# Patient Record
Sex: MALE | Race: WHITE | HISPANIC OR LATINO | URBAN - METROPOLITAN AREA
[De-identification: names, ages, dates, MRNs, and addresses within clinical notes are randomized per-mention and may not be internally consistent; named-entity substitution may affect disease eponyms.]

---

## 2019-12-27 ENCOUNTER — EMERGENCY (EMERGENCY)
Facility: HOSPITAL | Age: 59
LOS: 1 days | Discharge: HOME | End: 2019-12-27
Attending: EMERGENCY MEDICINE
Payer: SELF-PAY

## 2019-12-27 VITALS
SYSTOLIC BLOOD PRESSURE: 168 MMHG | OXYGEN SATURATION: 96 % | TEMPERATURE: 98 F | DIASTOLIC BLOOD PRESSURE: 99 MMHG | RESPIRATION RATE: 20 BRPM | HEART RATE: 73 BPM

## 2019-12-27 VITALS
HEIGHT: 66 IN | DIASTOLIC BLOOD PRESSURE: 104 MMHG | WEIGHT: 220.02 LBS | RESPIRATION RATE: 20 BRPM | SYSTOLIC BLOOD PRESSURE: 184 MMHG | TEMPERATURE: 98 F | HEART RATE: 84 BPM | OXYGEN SATURATION: 96 %

## 2019-12-27 LAB
ALBUMIN SERPL ELPH-MCNC: 4.1 G/DL — SIGNIFICANT CHANGE UP (ref 3.3–5)
ALP SERPL-CCNC: 88 U/L — SIGNIFICANT CHANGE UP (ref 40–120)
ALT FLD-CCNC: 35 U/L — SIGNIFICANT CHANGE UP (ref 10–45)
ANION GAP SERPL CALC-SCNC: 13 MMOL/L — SIGNIFICANT CHANGE UP (ref 5–17)
ANION GAP SERPL CALC-SCNC: 14 MMOL/L — SIGNIFICANT CHANGE UP (ref 5–17)
APPEARANCE UR: CLEAR — SIGNIFICANT CHANGE UP
AST SERPL-CCNC: 55 U/L — HIGH (ref 10–40)
BACTERIA # UR AUTO: NEGATIVE — SIGNIFICANT CHANGE UP
BASE EXCESS BLDV CALC-SCNC: -0.6 MMOL/L — SIGNIFICANT CHANGE UP (ref -2–2)
BASOPHILS # BLD AUTO: 0.1 K/UL — SIGNIFICANT CHANGE UP (ref 0–0.2)
BASOPHILS NFR BLD AUTO: 1 % — SIGNIFICANT CHANGE UP (ref 0–2)
BILIRUB SERPL-MCNC: 0.3 MG/DL — SIGNIFICANT CHANGE UP (ref 0.2–1.2)
BILIRUB UR-MCNC: NEGATIVE — SIGNIFICANT CHANGE UP
BUN SERPL-MCNC: 12 MG/DL — SIGNIFICANT CHANGE UP (ref 7–23)
BUN SERPL-MCNC: 12 MG/DL — SIGNIFICANT CHANGE UP (ref 7–23)
CA-I SERPL-SCNC: 1.09 MMOL/L — LOW (ref 1.12–1.3)
CALCIUM SERPL-MCNC: 8.9 MG/DL — SIGNIFICANT CHANGE UP (ref 8.4–10.5)
CALCIUM SERPL-MCNC: 8.9 MG/DL — SIGNIFICANT CHANGE UP (ref 8.4–10.5)
CHLORIDE BLDV-SCNC: 109 MMOL/L — HIGH (ref 96–108)
CHLORIDE SERPL-SCNC: 104 MMOL/L — SIGNIFICANT CHANGE UP (ref 96–108)
CHLORIDE SERPL-SCNC: 105 MMOL/L — SIGNIFICANT CHANGE UP (ref 96–108)
CO2 BLDV-SCNC: 25 MMOL/L — SIGNIFICANT CHANGE UP (ref 22–30)
CO2 SERPL-SCNC: 20 MMOL/L — LOW (ref 22–31)
CO2 SERPL-SCNC: 24 MMOL/L — SIGNIFICANT CHANGE UP (ref 22–31)
COLOR SPEC: COLORLESS — SIGNIFICANT CHANGE UP
CREAT SERPL-MCNC: 0.87 MG/DL — SIGNIFICANT CHANGE UP (ref 0.5–1.3)
CREAT SERPL-MCNC: 0.97 MG/DL — SIGNIFICANT CHANGE UP (ref 0.5–1.3)
DIFF PNL FLD: NEGATIVE — SIGNIFICANT CHANGE UP
EOSINOPHIL # BLD AUTO: 0.16 K/UL — SIGNIFICANT CHANGE UP (ref 0–0.5)
EOSINOPHIL NFR BLD AUTO: 1.6 % — SIGNIFICANT CHANGE UP (ref 0–6)
EPI CELLS # UR: 0 /HPF — SIGNIFICANT CHANGE UP
GAS PNL BLDV: 134 MMOL/L — LOW (ref 135–145)
GAS PNL BLDV: SIGNIFICANT CHANGE UP
GLUCOSE BLDV-MCNC: 99 MG/DL — SIGNIFICANT CHANGE UP (ref 70–99)
GLUCOSE SERPL-MCNC: 107 MG/DL — HIGH (ref 70–99)
GLUCOSE SERPL-MCNC: 85 MG/DL — SIGNIFICANT CHANGE UP (ref 70–99)
GLUCOSE UR QL: NEGATIVE — SIGNIFICANT CHANGE UP
HCO3 BLDV-SCNC: 24 MMOL/L — SIGNIFICANT CHANGE UP (ref 21–29)
HCT VFR BLD CALC: 47.7 % — SIGNIFICANT CHANGE UP (ref 39–50)
HCT VFR BLDA CALC: 51 % — HIGH (ref 39–50)
HGB BLD CALC-MCNC: 16.6 G/DL — SIGNIFICANT CHANGE UP (ref 13–17)
HGB BLD-MCNC: 16 G/DL — SIGNIFICANT CHANGE UP (ref 13–17)
HYALINE CASTS # UR AUTO: 0 /LPF — SIGNIFICANT CHANGE UP (ref 0–2)
IMM GRANULOCYTES NFR BLD AUTO: 0.5 % — SIGNIFICANT CHANGE UP (ref 0–1.5)
KETONES UR-MCNC: NEGATIVE — SIGNIFICANT CHANGE UP
LACTATE BLDV-MCNC: 1.8 MMOL/L — SIGNIFICANT CHANGE UP (ref 0.7–2)
LEUKOCYTE ESTERASE UR-ACNC: NEGATIVE — SIGNIFICANT CHANGE UP
LYMPHOCYTES # BLD AUTO: 2.39 K/UL — SIGNIFICANT CHANGE UP (ref 1–3.3)
LYMPHOCYTES # BLD AUTO: 24.2 % — SIGNIFICANT CHANGE UP (ref 13–44)
MCHC RBC-ENTMCNC: 30.1 PG — SIGNIFICANT CHANGE UP (ref 27–34)
MCHC RBC-ENTMCNC: 33.5 GM/DL — SIGNIFICANT CHANGE UP (ref 32–36)
MCV RBC AUTO: 89.8 FL — SIGNIFICANT CHANGE UP (ref 80–100)
MONOCYTES # BLD AUTO: 0.66 K/UL — SIGNIFICANT CHANGE UP (ref 0–0.9)
MONOCYTES NFR BLD AUTO: 6.7 % — SIGNIFICANT CHANGE UP (ref 2–14)
NEUTROPHILS # BLD AUTO: 6.52 K/UL — SIGNIFICANT CHANGE UP (ref 1.8–7.4)
NEUTROPHILS NFR BLD AUTO: 66 % — SIGNIFICANT CHANGE UP (ref 43–77)
NITRITE UR-MCNC: NEGATIVE — SIGNIFICANT CHANGE UP
NRBC # BLD: 0 /100 WBCS — SIGNIFICANT CHANGE UP (ref 0–0)
PCO2 BLDV: 42 MMHG — SIGNIFICANT CHANGE UP (ref 35–50)
PH BLDV: 7.38 — SIGNIFICANT CHANGE UP (ref 7.35–7.45)
PH UR: 6.5 — SIGNIFICANT CHANGE UP (ref 5–8)
PLATELET # BLD AUTO: 275 K/UL — SIGNIFICANT CHANGE UP (ref 150–400)
PO2 BLDV: 59 MMHG — HIGH (ref 25–45)
POTASSIUM BLDV-SCNC: 7.6 MMOL/L — CRITICAL HIGH (ref 3.5–5.3)
POTASSIUM SERPL-MCNC: 4.4 MMOL/L — SIGNIFICANT CHANGE UP (ref 3.5–5.3)
POTASSIUM SERPL-MCNC: 5.4 MMOL/L — HIGH (ref 3.5–5.3)
POTASSIUM SERPL-SCNC: 4.4 MMOL/L — SIGNIFICANT CHANGE UP (ref 3.5–5.3)
POTASSIUM SERPL-SCNC: 5.4 MMOL/L — HIGH (ref 3.5–5.3)
PROT SERPL-MCNC: 7.4 G/DL — SIGNIFICANT CHANGE UP (ref 6–8.3)
PROT UR-MCNC: NEGATIVE — SIGNIFICANT CHANGE UP
RBC # BLD: 5.31 M/UL — SIGNIFICANT CHANGE UP (ref 4.2–5.8)
RBC # FLD: 12.8 % — SIGNIFICANT CHANGE UP (ref 10.3–14.5)
RBC CASTS # UR COMP ASSIST: 2 /HPF — SIGNIFICANT CHANGE UP (ref 0–4)
SAO2 % BLDV: 92 % — HIGH (ref 67–88)
SODIUM SERPL-SCNC: 138 MMOL/L — SIGNIFICANT CHANGE UP (ref 135–145)
SODIUM SERPL-SCNC: 142 MMOL/L — SIGNIFICANT CHANGE UP (ref 135–145)
SP GR SPEC: 1.01 — LOW (ref 1.01–1.02)
UROBILINOGEN FLD QL: NEGATIVE — SIGNIFICANT CHANGE UP
WBC # BLD: 9.88 K/UL — SIGNIFICANT CHANGE UP (ref 3.8–10.5)
WBC # FLD AUTO: 9.88 K/UL — SIGNIFICANT CHANGE UP (ref 3.8–10.5)
WBC UR QL: 0 /HPF — SIGNIFICANT CHANGE UP (ref 0–5)

## 2019-12-27 PROCEDURE — 71046 X-RAY EXAM CHEST 2 VIEWS: CPT | Mod: 26

## 2019-12-27 PROCEDURE — 96375 TX/PRO/DX INJ NEW DRUG ADDON: CPT

## 2019-12-27 PROCEDURE — 81001 URINALYSIS AUTO W/SCOPE: CPT

## 2019-12-27 PROCEDURE — 80053 COMPREHEN METABOLIC PANEL: CPT

## 2019-12-27 PROCEDURE — 83605 ASSAY OF LACTIC ACID: CPT

## 2019-12-27 PROCEDURE — 71046 X-RAY EXAM CHEST 2 VIEWS: CPT

## 2019-12-27 PROCEDURE — 82435 ASSAY OF BLOOD CHLORIDE: CPT

## 2019-12-27 PROCEDURE — 96374 THER/PROPH/DIAG INJ IV PUSH: CPT

## 2019-12-27 PROCEDURE — 82947 ASSAY GLUCOSE BLOOD QUANT: CPT

## 2019-12-27 PROCEDURE — 85014 HEMATOCRIT: CPT

## 2019-12-27 PROCEDURE — 85027 COMPLETE CBC AUTOMATED: CPT

## 2019-12-27 PROCEDURE — 99284 EMERGENCY DEPT VISIT MOD MDM: CPT

## 2019-12-27 PROCEDURE — 84295 ASSAY OF SERUM SODIUM: CPT

## 2019-12-27 PROCEDURE — 83690 ASSAY OF LIPASE: CPT

## 2019-12-27 PROCEDURE — 84132 ASSAY OF SERUM POTASSIUM: CPT

## 2019-12-27 PROCEDURE — 82803 BLOOD GASES ANY COMBINATION: CPT

## 2019-12-27 PROCEDURE — 99284 EMERGENCY DEPT VISIT MOD MDM: CPT | Mod: 25

## 2019-12-27 PROCEDURE — 80048 BASIC METABOLIC PNL TOTAL CA: CPT

## 2019-12-27 PROCEDURE — 82330 ASSAY OF CALCIUM: CPT

## 2019-12-27 RX ORDER — METOCLOPRAMIDE HCL 10 MG
10 TABLET ORAL ONCE
Refills: 0 | Status: COMPLETED | OUTPATIENT
Start: 2019-12-27 | End: 2019-12-27

## 2019-12-27 RX ORDER — LIDOCAINE 4 G/100G
1 CREAM TOPICAL ONCE
Refills: 0 | Status: COMPLETED | OUTPATIENT
Start: 2019-12-27 | End: 2019-12-27

## 2019-12-27 RX ORDER — MORPHINE SULFATE 50 MG/1
2 CAPSULE, EXTENDED RELEASE ORAL ONCE
Refills: 0 | Status: DISCONTINUED | OUTPATIENT
Start: 2019-12-27 | End: 2019-12-27

## 2019-12-27 RX ORDER — KETOROLAC TROMETHAMINE 30 MG/ML
15 SYRINGE (ML) INJECTION ONCE
Refills: 0 | Status: DISCONTINUED | OUTPATIENT
Start: 2019-12-27 | End: 2019-12-27

## 2019-12-27 RX ADMIN — Medication 15 MILLIGRAM(S): at 18:20

## 2019-12-27 RX ADMIN — LIDOCAINE 1 PATCH: 4 CREAM TOPICAL at 20:31

## 2019-12-27 RX ADMIN — MORPHINE SULFATE 2 MILLIGRAM(S): 50 CAPSULE, EXTENDED RELEASE ORAL at 19:39

## 2019-12-27 RX ADMIN — Medication 10 MILLIGRAM(S): at 19:39

## 2019-12-27 NOTE — ED PROVIDER NOTE - PHYSICAL EXAMINATION
Gen: WDWN, NAD  HEENT: EOMI, no nasal discharge, mucous membranes moist  CV: RRR, +S1/S2, no M/R/G  Resp: CTAB, no W/R/R  GI: Abdomen soft non-distended, NTTP, no CVAT  MSK: No open wounds, no bruising, no LE edema, no lower back or flank ttp   Neuro: A&Ox4, following commands, moving all four extremities spontaneously. 5/5 strength x4, smooth gait. no focal neurological deficits  Psych: appropriate mood

## 2019-12-27 NOTE — ED PROVIDER NOTE - NSPTACCESSSVCSAPPTDETAILS_ED_ALL_ED_FT
[facility]  [address], [address] [address]  Phone: [phone]

## 2019-12-27 NOTE — ED PROVIDER NOTE - PROGRESS NOTE DETAILS
Went to see the pt and he was argumentative from the beginning of the encounter. continuing to repeat that there is something wrong and that he came to the hospital to find out what. raising his voice and interrupting during interview. Dr. Pablo came to bedside and patient continued to be argumentative, interrupting explanations and raising his voice. Told patient that we can do blood work and try to manage his pain but likely will not be able to find the source of his chronic pain today. Pt became very frustrated, yelling at Dr. Pablo and myself that he came to the hospital to find out where his pain is coming from. Returned to speak with patient after he calmed down. Pt became tearful and apologetic stating that he gets frustrated and angry when he is in pain. agrees with plan for blood work and pain management. Pt reporting that toradol did not help his pain but gave him a migraine. will give reglan and morphine. Pt reporting that toradol did not help his pain but gave him a migraine. will give reglan and morphine. Pt stating that he made a mistake earlier and that his pain has been going on for 2 weeks and not 2 months and that he hasn't seen his neurologist because he has been out of town. pt states that he has read and is aware that he had a thalamic CVA in august and that sensory changes can be a residual deficit. Discussed his lab and XR results in detail. will try a lidoderm patch and discussed need to follow up with pain management. Pt states his understanding. 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 pt was not in the waiting room and did not respond when his nurse called his cell phone.

## 2019-12-27 NOTE — ED PROVIDER NOTE - PATIENT PORTAL LINK FT
You can access the FollowMyHealth Patient Portal offered by [facility] by registering at the following website: [email or web address]. By joining [employer]’s FollowMyHealth portal, you will also be able to view your health information using other applications (apps) compatible with our system.

## 2019-12-27 NOTE — ED ADULT NURSE REASSESSMENT NOTE - NS ED NURSE REASSESS COMMENT FT1
Pt left without DC paperwork. IV had been removed. MD Vargas aware. Went to DC pt, bed was empty. RN went to waiting room and outside ED triage to find pt to give him paperwork. Pt # called without answer. Pt left without DC paperwork. IV had been removed earlier. MD Vargas aware.

## 2019-12-27 NOTE — ED ADULT NURSE NOTE - OBJECTIVE STATEMENT
58 y/o male presents to ED from home c/o acute on chronic R flank/hip/leg pain, decreased PO intake x 2 weeks (not months as stated in triage note - pt states he has memory issues and misspoke) along with  R shoulder pain/numbness. Pt states he has been to the hospital several times for similar symptoms, each time told to follow up with neurologist. Has had an MRI with no emergent findings. Last visit to ED was 1 month ago at [facility], was discharged and told to see neurologist which he did. Neurologist told him to increase oxycodone from 5mg to 10mg. pt states he has been taking increased dose for several weeks without relief. Pt states pain/numbness in R flank, arm, leg have worsened. Per pt "I know there's something going on inside me. None of the doctors are listening to me. I'm becoming paralyzed as we speak. My right leg is dragging sometimes. I had a brain injury when I was 31 years old and I forget things sometimes, but I know what I'm talking about now. This all started when I was bit by a tick in 1992." Patient is A&Ox4. Face is symmetrical. Speech is clear. Patient is moving all extremities with 5/5 strength and walks with steady gait. Denies chest pain, SOB, n/v/d, diaphoresis, fever, chills. Pt educated on plan of care. 20G IV placed in R hand. Safety and comfort provided.

## 2019-12-27 NOTE — ED PROVIDER NOTE - ATTENDING CONTRIBUTION TO CARE
Patient is a 58 yo M with history of HTN, hyperlipidemia, thalamic CVA in August 2019 here for right sided numbness in his leg and arm, feeling like there is a ball in his back. Patient reports symptoms like this weren't present after his stroke and started about 2 months ago. Symptoms are intermittent. He has been following with an outpatient neurologist, reports he had imaging, MRIs done that did not show a specific etiology. His doctor prescribed him gabapentin which is not working. Patient appears frustrated and upset and states he does not believe this is because of his stroke because he did not have it right afterwards. No chest pain, shortness of breath, fevers, chills, nausea, vomiting, cough.   VS noted  Gen. upset, NAD  HEENT: EOMI, mmm  Lungs: CTAB/L no C/ W /R   CVS: RRR   Abd; Soft non tender, non distended   Ext: no edema  Skin: no rash  Neuro AAOx3, CN 2-12 intact, strength 5/5 in UE/LE, no pronator drift, gait normal, finger to nose normal, clear speech  a/p: intermittent right sided numbness. Pt has a hx of thalamic stroke. I had a long discussion with patient. Given that symptoms are intermittent, likely secondary to his CVA, I suggested an attempt at pain control, check labs for electrolyte abnormalities. I offered imaging because patient states symptoms are worse in the past 2 weeks but patient states he had an MRI. Patient is very argumentative, repeatedly states he just wants his symptoms to resolve and wants an answer. After a long discussion, patient plans to follow up with his neurologist, pain management.   - Bev SANTANA

## 2019-12-27 NOTE — ED ADULT NURSE REASSESSMENT NOTE - NS ED NURSE REASSESS COMMENT FT1
During time of toradol admin, pt initially refused medication stating "I know that won't work for me. Why can't they give me something stronger?." Eventually agreed to take medication. Also states that numbness down R leg is worsening, MD Vargas made aware. No intervention at this time. Pt stating his appetite seems to be coming back. Safety and comfort provided.

## 2019-12-27 NOTE — ED PROVIDER NOTE - OBJECTIVE STATEMENT
58yo M Hx HTN, HLD, CVA (august 2019) p/w complaints of right sided flank pain x 2 months. 58yo M Hx HTN, HLD, thalamic CVA (august 2019) p/w complaints of right sided flank pain x 2 months. States it feels like there is a ball in his right back and has pain radiating up his back, into his arm, and down into his leg. cannot describe the pain. States that he has intermittent numbness in the leg and arm and that his fingers lock up. Pain wraps around the right back into the flank. Has been seen by his neurologist who feels that this is neuropathic pain and has prescribed neurontin, baclofen and oxycodone. Pt states that none of the prescribed medications has been helping and that he has been to several hospitals including [facility] and [facility] to try and find out what is causing his pain. Denies CP, SOB, headaches, fevers, N/V, weakness.

## 2019-12-27 NOTE — ED PROVIDER NOTE - NSFOLLOWUPINSTRUCTIONS_ED_ALL_ED_FT
you were seen in the emergency department for Right sided back pain and pain in the right arm and leg with numbness.     you had lab work, urine testing and a chest xray done today that didn't show any emergent findings.     Please follow up with your neurologist in 24-48 hours. you were seen in the emergency department for Right sided back pain and pain in the right arm and leg with numbness.     you had lab work, urine testing and a chest xray done today that didn't show any emergent findings.     Please follow up with your neurologist in 24-48 hours.     Follow up with the pain management specialist.     Please return to the emergency department if you develop any new or worsening symptoms including but not limited to the following: loss of consciousness, severe weakness, severe abdominal pain, chest pain, difficulty breathing, severe headache, loss of vision.

## 2019-12-27 NOTE — ED ADULT NURSE REASSESSMENT NOTE - NS ED NURSE REASSESS COMMENT FT1
Pt seen leaving ED through waiting room to talk on phone and smoke. RN spoke with pt telling him leaving department wasn't allowed and complied with returning to bed inside. Pt states he doesn't get phone service inside - pt provided pt phone for use. Pt taken to chest xray at 1909.

## 2019-12-27 NOTE — ED ADULT NURSE REASSESSMENT NOTE - NS ED NURSE REASSESS COMMENT FT1
MD Pablo spoke to pt about plan of care. Pt states he understands. Waiting for DC paperwork. IV removed intact.

## 2019-12-27 NOTE — ED PROVIDER NOTE - NS ED ROS FT
Gen: Denies fever  CV: Denies chest pain  Skin: Denies rash  Resp: Denies SOB, cough  GI: Denies abdominal pain, nausea, vomiting  Msk: + R sided back, flank, arm and leg pain Denies LE edema   : Denies dysuria, increased frequency  Neuro: +numbness. denies weakness

## 2019-12-27 NOTE — ED PROVIDER NOTE - CLINICAL SUMMARY MEDICAL DECISION MAKING FREE TEXT BOX
58yo M Hx thalamic CVA in august presenting with complaints of chronic R sided pain and numbness. Pt has been followed by his neurologist with trial medications including neurontin, baclofen and oxycodone without improvement. pt frustrated and argumentative throughout interview, but agreed to lab work and CXR. likely chronic neuropathic pain possible 2/2 CVA in august. Will obtain labs, UA and CXR, pain management and discharge with pain management referral.

## 2023-05-15 ENCOUNTER — EMERGENCY (EMERGENCY)
Facility: HOSPITAL | Age: 63
LOS: 1 days | Discharge: HOME | End: 2023-05-15
Attending: STUDENT IN AN ORGANIZED HEALTH CARE EDUCATION/TRAINING PROGRAM | Admitting: STUDENT IN AN ORGANIZED HEALTH CARE EDUCATION/TRAINING PROGRAM
Payer: SELF-PAY

## 2023-05-15 VITALS
DIASTOLIC BLOOD PRESSURE: 98 MMHG | TEMPERATURE: 98 F | HEART RATE: 88 BPM | SYSTOLIC BLOOD PRESSURE: 164 MMHG | HEIGHT: 66 IN | WEIGHT: 214.95 LBS | OXYGEN SATURATION: 95 %

## 2023-05-15 PROCEDURE — 99282 EMERGENCY DEPT VISIT SF MDM: CPT | Mod: 25

## 2023-05-15 PROCEDURE — 30901 CONTROL OF NOSEBLEED: CPT | Mod: RT

## 2023-05-15 PROCEDURE — 30901 CONTROL OF NOSEBLEED: CPT

## 2023-05-15 PROCEDURE — 99283 EMERGENCY DEPT VISIT LOW MDM: CPT | Mod: 25

## 2023-05-15 NOTE — ED PROVIDER NOTE - TEMPLATE, MLM
She may have to go without it, no samples - she could spread this out to 5 mg once daily to stretch out what she has left.   General

## 2023-05-15 NOTE — ED ADULT NURSE NOTE - OBJECTIVE STATEMENT
Patient complaining of nose bleed on blood thinning medication. Clopidogrel and ASA Patient has had this problem for some timed and was seen yesterday at [facility]. Blood dripping out of Right Nares. Patient is also complaining of right side body pain for 3 years.

## 2023-05-15 NOTE — ED PROVIDER NOTE - NSFOLLOWUPCLINICS_GEN_ALL_ED_FT
[facility] - ENT  Otolaryngology (ENT)  [address] [place] [address]  Phone: [phone]  Fax:

## 2023-05-15 NOTE — ED ADULT NURSE NOTE - CHIEF COMPLAINT QUOTE
Patient is a 61yo male complaining of nose bleed on blood thinning medication Clopidogrel ans ASA Patient has had this problem for some timed and was seen yesterday at [facility] Blood dripping out of Right Nares. Patient is also complaining of right side body pain for 3 years

## 2023-05-15 NOTE — ED PROVIDER NOTE - ATTENDING APP SHARED VISIT CONTRIBUTION OF CARE
I, Kirk Vallejo MD, have seen and examined the patient on the date of service.  I agree with the GREY's assessment as written, with exceptions or additions as noted below or in a separate note. pt with pmh of CVA on asa and plavix, chf is here with nose bleed. intermittent right naris bleed x1 week. seen at [facility], given afrin but unable to follow up with ent. bleeding resolved prior to my evaluation. denies nasal trauma. no active bleeding seen on exam. a/p likely anterior bleed, now resolved. hx not consistent with posterior bleed. pt requesting rhino rocket as he was concerned his nose would rebleed. he did not tolerate procedure. nose cauterized with silver nitrate stick. no further bleeding.

## 2023-05-15 NOTE — ED PROVIDER NOTE - OBJECTIVE STATEMENT
62-year-old male with past medical history of hypertension and CVA on aspirin and Plavix, CHF presents to the ED with complaints of intermittent episodes of right naris epistaxis x1 week.  Patient reports he was seen at [facility] last week, was given Afrin and told follow-up with ENT however he was unable to follow-up with ENT.  Reports today he was at work when his right naris started to bleed at 4:30 PM.  Bleeding resolved on arrival to the ED.  Patient denies any trauma to the nose, headache, dizziness, chest pain or shortness of breath

## 2023-05-15 NOTE — ED ADULT TRIAGE NOTE - CHIEF COMPLAINT QUOTE
Patient is a 61yo male complaining of nose bleed on blood thinning medication Clopidogrel ans ASA Patient has had this problem for some timed and was seen yesterday at [facility] Blood dripping out of Right Nares. Patient is also complaining of right side body pain for 3 years

## 2023-05-15 NOTE — ED PROVIDER NOTE - PHYSICAL EXAMINATION
Gen: Well appearing in NAD.   Eyes: PERRLA  ENT: oral pharynx unremarkable. No blood in the mouth. Dried blood in the right nares. No active bleeding noted   Head: atraumatic  Neuro: AAO x3,   Skin: Normal for race.   Psych: Alert and oriented

## 2023-05-15 NOTE — ED PROVIDER NOTE - PATIENT PORTAL LINK FT
You can access the FollowMyHealth Patient Portal offered by [facility] by registering at the following website: [email or web address]. By joining [employer]’s FollowMyHealth portal, you will also be able to view your health information using other applications (apps) compatible with our system.

## 2023-05-15 NOTE — ED PROVIDER NOTE - NSFOLLOWUPINSTRUCTIONS_ED_ALL_ED_FT
1. Use a humidifier  2. Apply vaseline to nose  3. Try not to pick or blow your nose  4. If nosebleed recurs, hold pressure at the bridge of the nose and look down  5. If bleeding continues or you feel lightheaded or dizzy, return to the ED  6. Follow up with the ENT clinic, call for an appointment     *****  Epistaxis    Epistaxis is the medical term for a nosebleed. Nosebleeds are common and can be caused by many conditions, such as injury, infections, dry environments, medicines, nose picking, and home heating and cooling systems. Try controlling your nosebleed by pinching your nose continuously for at least 10 minutes. Avoid lying down while you are having a nosebleed. Sit up and lean forward. Avoid blowing or sniffing your nose for a number of hours after having a nosebleed. Resume your normal activities as you are able, but avoid straining, lifting, or bending at the waist for several days. Maintain humidity in your home by using less air conditioning or by using a humidifier.     If your nose was packed by your health care provider, keep the packing inside of your nose until a health care provider removes it. If a balloon catheter was used to pack your nose, do not cut or remove it unless your health care provider has instructed you to do that.     Aspirin and blood thinners make bleeding more likely. If you are prescribed these medicines and you suffer from nosebleeds, ask your health care provider if you should stop taking the medicines or adjust the dose. Do not stop medicines unless directed by your health care provider.    SEEK IMMEDIATE MEDICAL CARE IF YOU HAVE ANY OF THE FOLLOWING SYMPTOMS: nosebleed lasting longer than 20 minutes, unusual bleeding from or bruising on other parts of your body, dizziness or lightheadedness, fainting, nosebleed occurring after a head injury, or fever.

## 2023-05-15 NOTE — ED ADULT NURSE NOTE - NSFALLUNIVINTERV_ED_ALL_ED
Bed/Stretcher in lowest position, wheels locked, appropriate side rails in place/Call bell, personal items and telephone in reach/Instruct patient to call for assistance before getting out of bed/chair/stretcher/Non-slip footwear applied when patient is off stretcher/[place] to call system/Physically safe environment - no spills, clutter or unnecessary equipment/Purposeful proactive rounding/Room/bathroom lighting operational, light cord in reach

## 2023-05-15 NOTE — ED PROVIDER NOTE - CLINICAL SUMMARY MEDICAL DECISION MAKING FREE TEXT BOX
62-year-old male with past medical history of hypertension and CVA on aspirin and Plavix, CHF presents to the ED with complaints of intermittent episodes of right naris epistaxis x1 week.  Patient reports he was seen at [facility] last week, was given Afrin and told follow-up with ENT however he was unable to follow-up with ENT.  Reports today he was at work when his right naris started to bleed at 4:30 PM.  Bleeding resolved on arrival to the ED.  Patient denies any trauma to the nose, headache, dizziness, chest pain or shortness of breath. PE as noted above. No active epistaxis. 62-year-old male with past medical history of hypertension and CVA on aspirin and Plavix, CHF presents to the ED with complaints of intermittent episodes of right naris epistaxis x1 week.  Patient reports he was seen at [facility] last week, was given Afrin and told follow-up with ENT however he was unable to follow-up with ENT.  Reports today he was at work when his right naris started to bleed at 4:30 PM.  Bleeding resolved on arrival to the ED.  Patient denies any trauma to the nose, headache, dizziness, chest pain or shortness of breath. PE as noted above. No active epistaxis.    1023pm: Patient wanting to have a Rhino Rocket placed because he was concerned that his nose would start bleeding again. Patient did not tolerate the Rhino Rocket he wanted to be removed.  Patient's epistaxis restarted. Area of bleeding was cauterized with silver nitrate sticks.  Patient was observed in the ED no bleeding noted. will DC

## 2023-05-16 PROBLEM — I63.9 CEREBRAL INFARCTION, UNSPECIFIED: Chronic | Status: ACTIVE | Noted: 2019-12-27

## 2023-05-16 PROBLEM — I10 ESSENTIAL (PRIMARY) HYPERTENSION: Chronic | Status: ACTIVE | Noted: 2019-12-27

## 2023-05-24 NOTE — ED ADULT TRIAGE NOTE - BP NONINVASIVE SYSTOLIC (MM HG)
Please notify patient regarding her strep throat test results negative.  Take medication as prescribed.  Anemia noted.  Keep appointment with Hematology as directed.  LDL elevated at 158 mg/dL.  Continue cholesterol medications as prescribed, lose weight, follow low-fat, low-cholesterol diet and keep fiber intake at 30 g per day if possible, stay hydrated with fluids exercise at least 30 minutes a day up to 5 days a week as.  Discussed in the clinic.  Thyroid within normal range, HIV and hemoglobin A1c within normal range no sign of diabetes.  Continue medications as prescribed, continue to follow-up with other providers as directed.  Return to clinic as needed. 184

## 2023-07-05 NOTE — ED ADULT TRIAGE NOTE - ADDITIONAL SAFETY/BANDS...
Patient Education    BRIGHT FUTURES HANDOUT- PATIENT  15 THROUGH 17 YEAR VISITS  Here are some suggestions from [facility] experts that may be of value to your family.     HOW YOU ARE DOING  Enjoy spending time with your family. Look for ways you can help at home.  Find ways to work with your family to solve problems. Follow your family s rules.  Form healthy friendships and find fun, safe things to do with friends.  Set high goals for yourself in school and activities and for your future.  Try to be responsible for your schoolwork and for getting to school or work on time.  Find ways to deal with stress. Talk with your parents or other trusted adults if you need help.  Always talk through problems and never use violence.  If you get angry with someone, walk away if you can.  Call for help if you are in a situation that feels dangerous.  Healthy dating relationships are built on respect, concern, and doing things both of you like to do.  When you re dating or in a sexual situation,  No  means NO. NO is OK.  Don t smoke, vape, use drugs, or drink alcohol. Talk with us if you are worried about alcohol or drug use in your family.    YOUR DAILY LIFE  Visit the dentist at least twice a year.  Brush your teeth at least twice a day and floss once a day.  Be a healthy eater. It helps you do well in school and sports.  Have vegetables, fruits, lean protein, and whole grains at meals and snacks.  Limit fatty, sugary, and salty foods that are low in nutrients, such as candy, chips, and ice cream.  Eat when you re hungry. Stop when you feel satisfied.  Eat with your family often.  Eat breakfast.  Drink plenty of water. Choose water instead of soda or sports drinks.  Make sure to get enough calcium every day.  Have 3 or more servings of low-fat (1%) or fat-free milk and other low-fat dairy products, such as yogurt and cheese.  Aim for at least 1 hour of physical activity every day.  Wear your mouth guard when playing  sports.  Get enough sleep.    YOUR FEELINGS  Be proud of yourself when you do something good.  Figure out healthy ways to deal with stress.  Develop ways to solve problems and make good decisions.  It s OK to feel up sometimes and down others, but if you feel sad most of the time, let us know so we can help you.  It s important for you to have accurate information about sexuality, your physical development, and your sexual feelings toward the opposite or same sex. Please consider asking us if you have any questions.    HEALTHY BEHAVIOR CHOICES  Choose friends who support your decision to not use tobacco, alcohol, or drugs. Support friends who choose not to use.  Avoid situations with alcohol or drugs.  Don t share your prescription medicines. Don t use other people s medicines.  Not having sex is the safest way to avoid pregnancy and sexually transmitted infections (STIs).  Plan how to avoid sex and risky situations.  If you re sexually active, protect against pregnancy and STIs by correctly and consistently using birth control along with a condom.  Protect your hearing at work, home, and concerts. Keep your earbud volume down.    STAYING SAFE  Always be a safe and cautious .  Insist that everyone use a lap and shoulder seat belt.  Limit the number of friends in the car and avoid driving at night.  Avoid distractions. Never text or talk on the phone while you drive.  Do not ride in a vehicle with someone who has been using drugs or alcohol.  If you feel unsafe driving or riding with someone, call someone you trust to drive you.  Wear helmets and protective gear while playing sports. Wear a helmet when riding a bike, a motorcycle, or an ATV or when skiing or skateboarding. Wear a life jacket when you do water sports.  Always use sunscreen and a hat when you re outside.  Fighting and carrying weapons can be dangerous. Talk with your parents, teachers, or doctor about how to avoid these  situations.        Consistent with Bright Futures: Guidelines for Health Supervision of Infants, Children, and Adolescents, 4th Edition  For more information, go to https://brightfutures.aap.org.           Patient Education    BRIGHT FUTURES HANDOUT- PARENT  15 THROUGH 17 YEAR VISITS  Here are some suggestions from [employer] Futures experts that may be of value to your family.     HOW YOUR FAMILY IS DOING  Set aside time to be with your teen and really listen to her hopes and concerns.  Support your teen in finding activities that interest him. Encourage your teen to help others in the community.  Help your teen find and be a part of positive after-school activities and sports.  Support your teen as she figures out ways to deal with stress, solve problems, and make decisions.  Help your teen deal with conflict.  If you are worried about your living or food situation, talk with us. Community agencies and programs such as SNAP can also provide information.    YOUR GROWING AND CHANGING TEEN  Make sure your teen visits the dentist at least twice a year.  Give your teen a fluoride supplement if the dentist recommends it.  Support your teen s healthy body weight and help him be a healthy eater.  Provide healthy foods.  Eat together as a family.  Be a role model.  Help your teen get enough calcium with low-fat or fat-free milk, low-fat yogurt, and cheese.  Encourage at least 1 hour of physical activity a day.  Praise your teen when she does something well, not just when she looks good.    YOUR TEEN S FEELINGS  If you are concerned that your teen is sad, depressed, nervous, irritable, hopeless, or angry, let us know.  If you have questions about your teen s sexual development, you can always talk with us.    HEALTHY BEHAVIOR CHOICES  Know your teen s friends and their parents. Be aware of where your teen is and what he is doing at all times.  Talk with your teen about your values and your expectations on drinking, drug use,  tobacco use, driving, and sex.  Praise your teen for healthy decisions about sex, tobacco, alcohol, and other drugs.  Be a role model.  Know your teen s friends and their activities together.  Lock your liquor in a cabinet.  Store prescription medications in a locked cabinet.  Be there for your teen when she needs support or help in making healthy decisions about her behavior.    SAFETY  Encourage safe and responsible driving habits.  Lap and shoulder seat belts should be used by everyone.  Limit the number of friends in the car and ask your teen to avoid driving at night.  Discuss with your teen how to avoid risky situations, who to call if your teen feels unsafe, and what you expect of your teen as a .  Do not tolerate drinking and driving.  If it is necessary to keep a gun in your home, store it unloaded and locked with the ammunition locked separately from the gun.      Consistent with Bright Futures: Guidelines for Health Supervision of Infants, Children, and Adolescents, 4th Edition  For more information, go to https://brightfutures.aap.org.            Additional Safety/Bands: